# Patient Record
(demographics unavailable — no encounter records)

---

## 2025-01-14 NOTE — END OF VISIT
[TextEntry] :  I, Carson Valdes, am scribing for and the presence of Dr. Richards the following sections HPI, Review of Systems, Past Medical, Family, Social History, Physical and Disposition

## 2025-01-14 NOTE — HISTORY OF PRESENT ILLNESS
[FreeTextEntry1] : 34yo P0 LMP:1/1/25 here for annual exam, no complaints. [Men] : men [Vaginal] : vaginal [FreeTextEntry2] : few years

## 2025-01-14 NOTE — DISCUSSION/SUMMARY
[FreeTextEntry1] : - Pap/HPV obtained today - Contraceptive options reviewed; pt declined - STI testing offered; declined -Mammo/sono given per pt request due to large, pendulous breast, pt has difficulty with self breast exam -Metformin sent for PCOS management; pt also doing conservative mgmt with diet and exercise